# Patient Record
Sex: FEMALE
[De-identification: names, ages, dates, MRNs, and addresses within clinical notes are randomized per-mention and may not be internally consistent; named-entity substitution may affect disease eponyms.]

---

## 2019-01-03 ENCOUNTER — HOSPITAL ENCOUNTER (OUTPATIENT)
Dept: HOSPITAL 92 - TBSIIMAG | Age: 64
Discharge: HOME | End: 2019-01-03
Attending: NEUROLOGICAL SURGERY
Payer: MEDICARE

## 2019-01-03 DIAGNOSIS — M47.816: ICD-10-CM

## 2019-01-03 DIAGNOSIS — Z98.1: ICD-10-CM

## 2019-01-03 DIAGNOSIS — M54.9: Primary | ICD-10-CM

## 2019-01-03 PROCEDURE — 72100 X-RAY EXAM L-S SPINE 2/3 VWS: CPT

## 2019-01-03 NOTE — RAD
LUMBAR SPINE TWO VIEWS:

1/3/19

 

HISTORY: 

Posterior fixation. 

 

FINDINGS:  

There are five lumbar type vertebrae. Prominent rightward convexed rotatory scoliotic curvature. Left
 pedicle screws are in place at the L4-5 level without perihardware lucency. Metallic markers associa
suzanne with interbody fusion material extends to the far anterior margins of the expected disc space. Th
ere is 0.7 cm spondylolisthesis at the postoperative L4-5 level. 

 

Other pedicles are intact. 

 

IMPRESSION:  

Degenerative and postoperative changes lower lumbar spine. 

 

 

 

POS: [facility]

## 2019-02-21 ENCOUNTER — HOSPITAL ENCOUNTER (OUTPATIENT)
Dept: HOSPITAL 92 - TBSIIMAG | Age: 64
Discharge: HOME | End: 2019-02-21
Attending: NEUROLOGICAL SURGERY
Payer: MEDICARE

## 2019-02-21 DIAGNOSIS — M51.36: Primary | ICD-10-CM

## 2019-02-21 DIAGNOSIS — Z98.890: ICD-10-CM

## 2019-02-21 PROCEDURE — 72100 X-RAY EXAM L-S SPINE 2/3 VWS: CPT

## 2019-02-21 NOTE — RAD
LUMBAR SPINE 2 VIEWS:

 

Date:  02/21/19 

 

HISTORY:  

M51.36 lumbar disc degeneration. 

 

COMPARISON:  

Radiographs dated 01/03/19. 

 

FINDINGS:

Left unilateral L4-5 posterior spinal fusion hardware is similar with diskectomy changes and disc spa
cer. No migration of the disc space.

 

Anterolisthesis is similar. No acute superimposed fracture or malalignment. 

 

IMPRESSION: 

Similar examination. Postsurgical changes are similar. 

 

 

POS: [facility] R

## 2019-11-13 ENCOUNTER — HOSPITAL ENCOUNTER (OUTPATIENT)
Dept: HOSPITAL 92 - TBSIIMAG | Age: 64
Discharge: HOME | End: 2019-11-13
Attending: NEUROLOGICAL SURGERY
Payer: MEDICARE

## 2019-11-13 DIAGNOSIS — M54.5: Primary | ICD-10-CM
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
## 2019-11-13 DIAGNOSIS — Z98.890: ICD-10-CM

## 2019-11-13 PROCEDURE — 72100 X-RAY EXAM L-S SPINE 2/3 VWS: CPT

## 2019-11-13 NOTE — RAD
EXAM:  XR Lumbar Spine 2 Or 3 View



DATE:  2019 12:00 AM



INDICATION:  History of fall and right-sided back pain



COMPARISON:  2019



FINDIN total images were submitted (AP and lateral). There is a posterior lateral interbody fusi
on at L4-5. There is intervertebral disc cage at L4-5. Instrumentation projects in the expected

position. Mild grade 1 anterolisthesis of L4 and L5 is stable. Mild multilevel disc degenerative dise
ase and facet osteoarthritic changes stable.





IMPRESSION:Stable postoperative lumbar spine. No acute fracture demonstrated.



Reported By: Zeeshan Baeza 

Electronically Signed:  2019 2:49 PM

## 2019-11-26 ENCOUNTER — HOSPITAL ENCOUNTER (OUTPATIENT)
Dept: HOSPITAL 92 - RAD | Age: 64
Discharge: HOME | End: 2019-11-26
Attending: NEUROLOGICAL SURGERY
Payer: MEDICARE

## 2019-11-26 VITALS — TEMPERATURE: 98.1 F | SYSTOLIC BLOOD PRESSURE: 127 MMHG | DIASTOLIC BLOOD PRESSURE: 79 MMHG

## 2019-11-26 VITALS — BODY MASS INDEX: 53.8 KG/M2

## 2019-11-26 DIAGNOSIS — G89.4: ICD-10-CM

## 2019-11-26 DIAGNOSIS — M54.42: Primary | ICD-10-CM

## 2019-11-26 DIAGNOSIS — M43.16: ICD-10-CM

## 2019-11-26 DIAGNOSIS — G43.909: ICD-10-CM

## 2019-11-26 DIAGNOSIS — I10: ICD-10-CM

## 2019-11-26 DIAGNOSIS — F32.9: ICD-10-CM

## 2019-11-26 DIAGNOSIS — Z98.1: ICD-10-CM

## 2019-11-26 DIAGNOSIS — F41.9: ICD-10-CM

## 2019-11-26 DIAGNOSIS — M48.061: ICD-10-CM

## 2019-11-26 DIAGNOSIS — Z79.899: ICD-10-CM

## 2019-11-26 PROCEDURE — 62304 MYELOGRAPHY LUMBAR INJECTION: CPT

## 2019-11-26 PROCEDURE — B01B1ZZ FLUOROSCOPY OF SPINAL CORD USING LOW OSMOLAR CONTRAST: ICD-10-PCS | Performed by: RADIOLOGY

## 2019-11-26 PROCEDURE — 72132 CT LUMBAR SPINE W/DYE: CPT

## 2019-11-26 NOTE — RAD
Myelogram of Lumbar spine



CLINICAL HISTORY: Pain



PROCEDURE: Informed consent was obtained.  imaging was performed. Patient was placed in a prone 
position and the skin of the low back was prepped and draped in a standard sterile fashion. Topical

anesthesia was achieved with buffered 1% lidocaine. 22-gauge spinal needle was then advanced uneventf
ully into the thecal sac from a posterior para midline approach at the left  L2-3 level. 9 cc of

radiopaque contrast was instilled under low pressure into the thecal sac, upon return of clear colorl
ess CSF the needle hub. Imaging was stored for documentation. Needle was removed. Patient tolerated

the procedure well, without complication evident.



Patient was then transferred to CT to undergo subsequent CT myelogram imaging. Reference separate miguel marin(s) for additional details.



FINDINGS: Intraoperative imaging reveals a needle overlying the lumbar spinal canal, with subsequent 
instillation of radiopaque contrast within the thecal sac.



Fluoroscopy data: 0.1 minutes, 197 mcg/sq m



IMPRESSION: Technically successful myelogram, as above.



Reported By: Scott Heard 

Electronically Signed:  11/26/2019 11:57 AM

## 2019-11-26 NOTE — CT
CT LUMBAR SPINE WITH CONTRAST:



HISTORY:

Back Pain.



COMPARISON:

No prior CT exams of the lumbar spine available.



FINDINGS: 

There is a transitional lumbosacral segment, with sacralization of L5.



The conus medullaris terminates at the  inferior L1 level.



There is left-sided postoperative fusion spanning L4 and L5 with pedicle screw of L4 and L5 and inter
vening vertical larisa and disc space prosthesis.



There is abnormal morphology of the cauda equina demonstrating a clumped appearance spanning the mid 
lumbar level inferiorly with an empty thecal sac appearance inferiorly.



L1-2:No significant stenosis.



L2-3:Disc osteophyte results in mild stenosis of the central canal and left neural foramen. No signif
icant right foraminal stenosis.



L3-4:There is moderate central canal stenosis due to broad-based disc osteophyte. There is mild right
 and moderate left foraminal stenosis.



L4-5:There is grade I spondylolisthesis. Evidence of prior posterior decompression with a left pedicl
e screw producing streak artifact limiting visualization. There is moderate bilateral neural

foraminal stenosis. There is AP curvilinear elongation of the thecal sac.



L5-S1:No significant stenosis.



Incidental note of focal cystic abnormality in the left pelvis.



IMPRESSION:

1.  Multilevel degenerative change of the postoperative lumbar spine as above.



2.  Abnormal clumped configuration of the nerve roots of cauda equina, as well as an empty thecal sac
 inferiorly. 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 Correlate clinically.



3.  Incidental note of cystic abnormality of the left pelvis which could be adnexal in origin. Recomm
end follow-up with dedicated pelvic ultrasound.



Transcribed Date/Time: 11/26/2019 10:11 AM



Reported By: Scott Heard 

Electronically Signed:  11/26/2019 12:53 PM